# Patient Record
(demographics unavailable — no encounter records)

---

## 2024-10-29 NOTE — PROCEDURE
[Right] : of the right [Other: ____] : [unfilled] [___ cc    6mg] :  Betamethasone (Celestone) ~Vcc of 6mg [___ cc    1%] : Lidocaine ~Vcc of 1%  [___ cc    0.25%] : Bupivacaine (Marcaine) ~Vcc of 0.25%

## 2024-10-29 NOTE — HISTORY OF PRESENT ILLNESS
[0] : 0 [de-identified] : 8/8/2024: Josh Cartagena an 78 year old female here for evaluation of her right foot. Patient states she has been having pain in thr foot for 6 months. She saw her PCP and was an MRI done. Pain localized along the plantar foot.  HAs a hx of a plantar fasciitis she was seeing Dr. Boyd for. WB in [facility]. Tried PT, night splint, shockwave  Had surgical release 12 yrs ago  9/13/2024: PRP right plantar fascia   10/01/2024: Patient here for follow up on the right foot. Patient states that the right foot is feeling slightly better. She has no pain. Patient is wearing boot. 10/29/24: Patient is here to follow up on the right foot. She reports 70% improved since PRP, but continues to have residual pain. She has been going to PT, and she was told that she should have the bursitis on her right heel examined, though pt has no complaint of heel pain. Recent MRI from 6/24, showed no bursitis.  [] : no [FreeTextEntry1] : right foot.

## 2024-10-29 NOTE — PHYSICAL EXAM
[NL (40)] : plantar flexion 40 degrees [NL 30)] : inversion 30 degrees [NL (20)] : eversion 20 degrees [5___] : plantar flexion 5[unfilled]/5 [] : non-antalgic [de-identified] : slight tinels

## 2024-11-19 NOTE — PHYSICAL EXAM
[NL (40)] : plantar flexion 40 degrees [NL 30)] : inversion 30 degrees [NL (20)] : eversion 20 degrees [5___] : plantar flexion 5[unfilled]/5 [] : non-antalgic [de-identified] : slight tinels

## 2024-11-19 NOTE — HISTORY OF PRESENT ILLNESS
[0] : 0 [de-identified] : 8/8/2024: Josh Cartagena an 78 year old female here for evaluation of her right foot. Patient states she has been having pain in thr foot for 6 months. She saw her PCP and was an MRI done. Pain localized along the plantar foot.  HAs a hx of a plantar fasciitis she [facility] Tried PT, night splint, shockwave  Had surgical release 12 yrs ago  9/13/2024: PRP right plantar fascia   10/01/2024: Patient here for follow up on the right foot. Patient states that the right foot is feeling slightly better. She has no pain. Patient is wearing boot. 10/29/24: Patient is here to follow up on the right foot. She reports 70% improved since PRP, but continues to have residual pain. She has been going to PT, and she was told that she should have the bursitis on her right heel examined, though pt has no complaint of heel pain. Recent MRI from 6/24, showed no bursitis.  11/19/24: patient is here for the right foot pain. has been seeing improvements. patient has been going to physical therapy 1-2 times a week. Night splint  Tarsal tunnel injection helpful [] : no [FreeTextEntry1] : right foot.

## 2024-11-19 NOTE — HISTORY OF PRESENT ILLNESS
[0] : 0 [de-identified] : 8/8/2024: Josh Cartagena an 78 year old female here for evaluation of her right foot. Patient states she has been having pain in thr foot for 6 months. She saw her PCP and was an MRI done. Pain localized along the plantar foot.  HAs a hx of a plantar fasciitis she was seeing Dr. Boyd for. WB in [facility]. Tried PT, night splint, shockwave  Had surgical release 12 yrs ago  9/13/2024: PRP right plantar fascia   10/01/2024: Patient here for follow up on the right foot. Patient states that the right foot is feeling slightly better. She has no pain. Patient is wearing boot. 10/29/24: Patient is here to follow up on the right foot. She reports 70% improved since PRP, but continues to have residual pain. She has been going to PT, and she was told that she should have the bursitis on her right heel examined, though pt has no complaint of heel pain. Recent MRI from 6/24, showed no bursitis.  11/19/24: patient is here for the right foot pain. has been seeing improvements. patient has been going to physical therapy 1-2 times a week. Night splint  Tarsal tunnel injection helpful [] : no [FreeTextEntry1] : right foot.

## 2024-11-19 NOTE — PHYSICAL EXAM
[NL (40)] : plantar flexion 40 degrees [NL 30)] : inversion 30 degrees [NL (20)] : eversion 20 degrees [5___] : plantar flexion 5[unfilled]/5 [] : non-antalgic [de-identified] : slight tinels

## 2025-01-07 NOTE — DATA REVIEWED
[EMG Nerve Conduction] : A EMG Nerve Conduction test was completed of the [Lower extremity] : lower extremity [Positive] : positive [FreeTextEntry1] : 4/1/2019: possible right sided tarsal tunnel syndrome.

## 2025-01-07 NOTE — DISCUSSION/SUMMARY
[de-identified] : Continue home exercises. Shoe modifications discussed. Prior EMG from 2019 reviewed showing possible right tarsal tunnel syndrome.  Would update the EMG.  Can consider right plantar fascia/tarsal tunnel release surgery

## 2025-01-07 NOTE — HISTORY OF PRESENT ILLNESS
[0] : 0 [de-identified] : 8/8/2024: Josh Cartagena an 78 year old female here for evaluation of her right foot. Patient states she has been having pain in thr foot for 6 months. She saw her PCP and was an MRI done. Pain localized along the plantar foot.  HAs a hx of a plantar fasciitis she was seeing Dr. Boyd for. WB in sneakers. Tried PT, night splint, shockwave  Had surgical release 12 yrs ago  9/13/2024: PRP right plantar fascia   10/01/2024: Patient here for follow up on the right foot. Patient states that the right foot is feeling slightly better. She has no pain. Patient is wearing boot. 10/29/24: Patient is here to follow up on the right foot. She reports 70% improved since PRP, but continues to have residual pain. She has been going to PT, and she was told that she should have the bursitis on her right heel examined, though pt has no complaint of heel pain. Recent MRI from 6/24, showed no bursitis.  11/19/24: patient is here for the right foot pain. has been seeing improvements. patient has been going to physical therapy 1-2 times a week. Night splint  Tarsal tunnel injection helpful  01/07/2025: PT is here to F/U w R foot. States, the pain in the R foot is still bothering her. Feels like a pin is in her heel. She just completed PT.  Night splint without relief. Sneakers/boots more irritating. Lidoderm patches without relief.  [] : no [FreeTextEntry1] : right foot.

## 2025-01-07 NOTE — PHYSICAL EXAM
[NL (40)] : plantar flexion 40 degrees [NL 30)] : inversion 30 degrees [NL (20)] : eversion 20 degrees [5___] : plantar flexion 5[unfilled]/5 [Right] : right foot and ankle [2+] : dorsalis pedis pulse: 2+ [] : non-antalgic [de-identified] : slight tinels

## 2025-01-24 NOTE — DISCUSSION/SUMMARY
[de-identified] : Discussed plantar fascia/tarsal tunnel rel;ease  Can trial gabapentin, will need to confirm with psychiatrist

## 2025-01-24 NOTE — PHYSICAL EXAM
[Right] : right foot and ankle [NL (40)] : plantar flexion 40 degrees [NL 30)] : inversion 30 degrees [NL (20)] : eversion 20 degrees [5___] : plantar flexion 5[unfilled]/5 [2+] : dorsalis pedis pulse: 2+ [] : non-antalgic [de-identified] : slight tinels

## 2025-01-24 NOTE — HISTORY OF PRESENT ILLNESS
[0] : 0 [de-identified] : 8/8/2024: Josh Cartagena an 78 year old female here for evaluation of her right foot. Patient states she has been having pain in thr foot for 6 months. She saw her PCP and was an MRI done. Pain localized along the plantar foot.  HAs a hx of a plantar fasciitis she was seeing Dr. Boyd for. WB in sneakers. Tried PT, night splint, shockwave  Had surgical release 12 yrs ago  9/13/2024: PRP right plantar fascia   10/01/2024: Patient here for follow up on the right foot. Patient states that the right foot is feeling slightly better. She has no pain. Patient is wearing boot. 10/29/24: Patient is here to follow up on the right foot. She reports 70% improved since PRP, but continues to have residual pain. She has been going to PT, and she was told that she should have the bursitis on her right heel examined, though pt has no complaint of heel pain. Recent MRI from 6/24, showed no bursitis.  11/19/24: patient is here for the right foot pain. has been seeing improvements. patient has been going to physical therapy 1-2 times a week. Night splint  Tarsal tunnel injection helpful  01/07/2025: PT is here to F/U w R foot. States, the pain in the R foot is still bothering her. Feels like a pin is in her heel. She just completed PT.  Night splint without relief. Sneakers/boots more irritating. Lidoderm patches without relief.  1/24/2025: EMG results,  R foot [] : no [FreeTextEntry1] : right foot.

## 2025-02-13 NOTE — DISCUSSION/SUMMARY
[de-identified] : Discussed plantar fascia/tarsal tunnel release R tarsal tunnel injection tolerated well   Can trial gabapentin, will need to confirm with psychiatrist

## 2025-02-13 NOTE — HISTORY OF PRESENT ILLNESS
[0] : 0 [de-identified] : 8/8/2024: Josh Cartagena an 78 year old female here for evaluation of her right foot. Patient states she has been having pain in thr foot for 6 months. She saw her PCP and was an MRI done. Pain localized along the plantar foot.  HAs a hx of a plantar fasciitis she was seeing Dr. Boyd for. WB in sneakers. Tried PT, night splint, shockwave  Had surgical release 12 yrs ago  9/13/2024: PRP right plantar fascia   10/01/2024: Patient here for follow up on the right foot. Patient states that the right foot is feeling slightly better. She has no pain. Patient is wearing boot. 10/29/24: Patient is here to follow up on the right foot. She reports 70% improved since PRP, but continues to have residual pain. She has been going to PT, and she was told that she should have the bursitis on her right heel examined, though pt has no complaint of heel pain. Recent MRI from 6/24, showed no bursitis.  11/19/24: patient is here for the right foot pain. has been seeing improvements. patient has been going to physical therapy 1-2 times a week. Night splint  Tarsal tunnel injection helpful  01/07/2025: PT is here to F/U w R foot. States, the pain in the R foot is still bothering her. Feels like a pin is in her heel. She just completed PT.  Night splint without relief. Sneakers/boots more irritating. Lidoderm patches without relief.  1/24/2025: EMG results,  R foot 02/13/2025: FU Right foot. Pain has improved slightly since last visit. HEP daily. Tingling in heel WB in boots. PMD increased Gabapentin to 300mg  Requesting injection [] : no [FreeTextEntry1] : right foot.

## 2025-02-13 NOTE — PROCEDURE
[Other: ____] : [unfilled] [Right] : of the right [Pain] : pain [Inflammation] : inflammation [Repeat series performed] : repeat series performed [Alcohol] : alcohol [Ethyl Chloride sprayed topically] : ethyl chloride sprayed topically [Sterile technique used] : sterile technique used [___ cc    6mg] :  Betamethasone (Celestone) ~Vcc of 6mg [___ cc    2%] : Lidocaine ~Vcc of 2%  [___ cc    0.5%] : Bupivacaine (Marcaine) ~Vcc of 0.5%  [] : Patient tolerated procedure well [Call if redness, pain or fever occur] : call if redness, pain or fever occur [Apply ice for 15min out of every hour for the next 12-24 hours as tolerated] : apply ice for 15 minutes out of every hour for the next 12-24 hours as tolerated [Patient was advised to rest the joint(s) for ____ days] : patient was advised to rest the joint(s) for [unfilled] days [Previous OTC use and PT nontherapeutic] : patient has tried OTC's including aspirin, Ibuprofen, Aleve, etc or prescription NSAIDS, and/or exercises at home and/or physical therapy without satisfactory response [Patient had decreased mobility in the joint] : patient had decreased mobility in the joint [Risks, benefits, alternatives discussed / Verbal consent obtained] : the risks benefits, and alternatives have been discussed, and verbal consent was obtained [de-identified] : post tib nerve

## 2025-02-13 NOTE — PHYSICAL EXAM
[Right] : right foot and ankle [NL (40)] : plantar flexion 40 degrees [NL 30)] : inversion 30 degrees [NL (20)] : eversion 20 degrees [5___] : plantar flexion 5[unfilled]/5 [2+] : dorsalis pedis pulse: 2+ [] : non-antalgic [de-identified] : slight tinels

## 2025-03-11 NOTE — DISCUSSION/SUMMARY
[de-identified] : Discussed plantar fascia/tarsal tunnel release-= patient does not want surgery at this time  Will increase gabapentin to 200 hs and then during the day if tolerated

## 2025-03-11 NOTE — PHYSICAL EXAM
[Right] : right foot and ankle [NL (40)] : plantar flexion 40 degrees [NL 30)] : inversion 30 degrees [NL (20)] : eversion 20 degrees [5___] : plantar flexion 5[unfilled]/5 [2+] : dorsalis pedis pulse: 2+ [] : non-antalgic [de-identified] : slight tinels

## 2025-03-11 NOTE — HISTORY OF PRESENT ILLNESS
[0] : 0 [de-identified] : 8/8/2024: Josh Cartagena an 78 year old female here for evaluation of her right foot. Patient states she has been having pain in thr foot for 6 months. She saw her PCP and was an MRI done. Pain localized along the plantar foot.  HAs a hx of a plantar fasciitis she was seeing Dr. Boyd for. WB in sneakers. Tried PT, night splint, shockwave  Had surgical release 12 yrs ago  9/13/2024: PRP right plantar fascia   10/01/2024: Patient here for follow up on the right foot. Patient states that the right foot is feeling slightly better. She has no pain. Patient is wearing boot. 10/29/24: Patient is here to follow up on the right foot. She reports 70% improved since PRP, but continues to have residual pain. She has been going to PT, and she was told that she should have the bursitis on her right heel examined, though pt has no complaint of heel pain. Recent MRI from 6/24, showed no bursitis.  11/19/24: patient is here for the right foot pain. has been seeing improvements. patient has been going to physical therapy 1-2 times a week. Night splint  Tarsal tunnel injection helpful  01/07/2025: PT is here to F/U w R foot. States, the pain in the R foot is still bothering her. Feels like a pin is in her heel. She just completed PT.  Night splint without relief. Sneakers/boots more irritating. Lidoderm patches without relief.  1/24/2025: EMG results,  R foot 02/13/2025: FU Right foot. Pain has improved slightly since last visit. HEP daily. Tingling in heel WB in boots. PMD increased Gabapentin to 300mg  Requesting injection 03/11/2025 Patient is following up on right foot. Pain has improved in the foot. She states she gets throbbing pain in the heel.  Patient has complete PT. WB in shoes. Had tarsal tunnel injection on 2/13/25 with relief of pain for a few weeks. Taking gabapentin 100mg TID as needed without relief  [] : no [FreeTextEntry1] : right foot.